# Patient Record
Sex: FEMALE | Race: WHITE | NOT HISPANIC OR LATINO | Employment: UNEMPLOYED | URBAN - METROPOLITAN AREA
[De-identification: names, ages, dates, MRNs, and addresses within clinical notes are randomized per-mention and may not be internally consistent; named-entity substitution may affect disease eponyms.]

---

## 2017-02-25 ENCOUNTER — HOSPITAL ENCOUNTER (EMERGENCY)
Facility: MEDICAL CENTER | Age: 42
End: 2017-02-25
Attending: EMERGENCY MEDICINE
Payer: MEDICAID

## 2017-02-25 VITALS
BODY MASS INDEX: 22.06 KG/M2 | WEIGHT: 129.19 LBS | DIASTOLIC BLOOD PRESSURE: 79 MMHG | SYSTOLIC BLOOD PRESSURE: 117 MMHG | HEIGHT: 64 IN | TEMPERATURE: 99.5 F | HEART RATE: 89 BPM | RESPIRATION RATE: 18 BRPM | OXYGEN SATURATION: 95 %

## 2017-02-25 DIAGNOSIS — F41.8 SITUATIONAL ANXIETY: ICD-10-CM

## 2017-02-25 DIAGNOSIS — S61.219A LACERATION OF FINGER OF LEFT HAND, INITIAL ENCOUNTER: ICD-10-CM

## 2017-02-25 PROCEDURE — 36415 COLL VENOUS BLD VENIPUNCTURE: CPT

## 2017-02-25 PROCEDURE — 304999 HCHG REPAIR-SIMPLE/INTERMED LEVEL 1

## 2017-02-25 PROCEDURE — 304217 HCHG IRRIGATION SYSTEM

## 2017-02-25 PROCEDURE — 700101 HCHG RX REV CODE 250: Performed by: EMERGENCY MEDICINE

## 2017-02-25 PROCEDURE — 90471 IMMUNIZATION ADMIN: CPT

## 2017-02-25 PROCEDURE — 90715 TDAP VACCINE 7 YRS/> IM: CPT | Performed by: EMERGENCY MEDICINE

## 2017-02-25 PROCEDURE — 700111 HCHG RX REV CODE 636 W/ 250 OVERRIDE (IP): Performed by: EMERGENCY MEDICINE

## 2017-02-25 PROCEDURE — 303747 HCHG EXTRA SUTURE

## 2017-02-25 PROCEDURE — 99283 EMERGENCY DEPT VISIT LOW MDM: CPT

## 2017-02-25 RX ORDER — HYDROCODONE BITARTRATE AND ACETAMINOPHEN 5; 325 MG/1; MG/1
1-2 TABLET ORAL EVERY 4 HOURS PRN
Qty: 16 TAB | Refills: 0 | Status: SHIPPED | OUTPATIENT
Start: 2017-02-25

## 2017-02-25 RX ORDER — CEPHALEXIN 500 MG/1
500 CAPSULE ORAL 4 TIMES DAILY
Qty: 20 CAP | Refills: 0 | Status: SHIPPED | OUTPATIENT
Start: 2017-02-25 | End: 2017-03-02

## 2017-02-25 RX ADMIN — LIDOCAINE HYDROCHLORIDE 8 ML: 20 INJECTION, SOLUTION INFILTRATION; PERINEURAL at 13:19

## 2017-02-25 RX ADMIN — CLOSTRIDIUM TETANI TOXOID ANTIGEN (FORMALDEHYDE INACTIVATED), CORYNEBACTERIUM DIPHTHERIAE TOXOID ANTIGEN (FORMALDEHYDE INACTIVATED), BORDETELLA PERTUSSIS TOXOID ANTIGEN (GLUTARALDEHYDE INACTIVATED), BORDETELLA PERTUSSIS FILAMENTOUS HEMAGGLUTININ ANTIGEN (FORMALDEHYDE INACTIVATED), BORDETELLA PERTUSSIS PERTACTIN ANTIGEN, AND BORDETELLA PERTUSSIS FIMBRIAE 2/3 ANTIGEN 0.5 ML: 5; 2; 2.5; 5; 3; 5 INJECTION, SUSPENSION INTRAMUSCULAR at 13:19

## 2017-02-25 NOTE — DISCHARGE INSTRUCTIONS
Laceration Care, Adult  A laceration is a cut that goes through all of the layers of the skin and into the tissue that is right under the skin. Some lacerations heal on their own. Others need to be closed with stitches (sutures), staples, skin adhesive strips, or skin glue. Proper laceration care minimizes the risk of infection and helps the laceration to heal better.  HOW TO CARE FOR YOUR LACERATION  If sutures or staples were used:  · Keep the wound clean and dry.  · If you were given a bandage (dressing), you should change it at least one time per day or as told by your health care provider. You should also change it if it becomes wet or dirty.  · Keep the wound completely dry for the first 24 hours or as told by your health care provider. After that time, you may shower or bathe. However, make sure that the wound is not soaked in water until after the sutures or staples have been removed.  · Clean the wound one time each day or as told by your health care provider:  ¨ Wash the wound with soap and water.  ¨ Rinse the wound with water to remove all soap.  ¨ Pat the wound dry with a clean towel. Do not rub the wound.  · After cleaning the wound, apply a thin layer of antibiotic ointment as told by your health care provider. This will help to prevent infection and keep the dressing from sticking to the wound.  · Have the sutures or staples removed as told by your health care provider.  If skin adhesive strips were used:  · Keep the wound clean and dry.  · If you were given a bandage (dressing), you should change it at least one time per day or as told by your health care provider. You should also change it if it becomes dirty or wet.  · Do not get the skin adhesive strips wet. You may shower or bathe, but be careful to keep the wound dry.  · If the wound gets wet, pat it dry with a clean towel. Do not rub the wound.  · Skin adhesive strips fall off on their own. You may trim the strips as the wound heals. Do not  remove skin adhesive strips that are still stuck to the wound. They will fall off in time.  If skin glue was used:  · Try to keep the wound dry, but you may briefly wet it in the shower or bath. Do not soak the wound in water, such as by swimming.  · After you have showered or bathed, gently pat the wound dry with a clean towel. Do not rub the wound.  · Do not do any activities that will make you sweat heavily until the skin glue has fallen off on its own.  · Do not apply liquid, cream, or ointment medicine to the wound while the skin glue is in place. Using those may loosen the film before the wound has healed.  · If you were given a bandage (dressing), you should change it at least one time per day or as told by your health care provider. You should also change it if it becomes dirty or wet.  · If a dressing is placed over the wound, be careful not to apply tape directly over the skin glue. Doing that may cause the glue to be pulled off before the wound has healed.  · Do not pick at the glue. The skin glue usually remains in place for 5-10 days, then it falls off of the skin.  General Instructions  · Take over-the-counter and prescription medicines only as told by your health care provider.  · If you were prescribed an antibiotic medicine or ointment, take or apply it as told by your doctor. Do not stop using it even if your condition improves.  · To help prevent scarring, make sure to cover your wound with sunscreen whenever you are outside after stitches are removed, after adhesive strips are removed, or when glue remains in place and the wound is healed. Make sure to wear a sunscreen of at least 30 SPF.  · Do not scratch or pick at the wound.  · Keep all follow-up visits as told by your health care provider. This is important.  · Check your wound every day for signs of infection. Watch for:  ¨ Redness, swelling, or pain.  ¨ Fluid, blood, or pus.  · Raise (elevate) the injured area above the level of your heart  while you are sitting or lying down, if possible.  SEEK MEDICAL CARE IF:  · You received a tetanus shot and you have swelling, severe pain, redness, or bleeding at the injection site.  · You have a fever.  · A wound that was closed breaks open.  · You notice a bad smell coming from your wound or your dressing.  · You notice something coming out of the wound, such as wood or glass.  · Your pain is not controlled with medicine.  · You have increased redness, swelling, or pain at the site of your wound.  · You have fluid, blood, or pus coming from your wound.  · You notice a change in the color of your skin near your wound.  · You need to change the dressing frequently due to fluid, blood, or pus draining from the wound.  · You develop a new rash.  · You develop numbness around the wound.  SEEK IMMEDIATE MEDICAL CARE IF:  · You develop severe swelling around the wound.  · Your pain suddenly increases and is severe.  · You develop painful lumps near the wound or on skin that is anywhere on your body.  · You have a red streak going away from your wound.  · The wound is on your hand or foot and you cannot properly move a finger or toe.  · The wound is on your hand or foot and you notice that your fingers or toes look pale or bluish.     This information is not intended to replace advice given to you by your health care provider. Make sure you discuss any questions you have with your health care provider.     Document Released: 12/18/2006 Document Revised: 05/03/2016 Document Reviewed: 12/14/2015  [employer] Interactive Patient Education ©2016 [employer]

## 2017-02-25 NOTE — ED AVS SNAPSHOT
Home Care Instructions                                                                                                                Ama Ferrera   MRN: [id number]    Department:  [facility], Emergency Dept   Date of Visit:  2/25/2017            [facility], Emergency Dept    [address] [place] [id number]    Phone:  [phone]      You were seen by     Duran Vera M.D.      Your Diagnosis Was     Laceration of finger of left hand, initial encounter     S61.219A       These are the medications you received during your hospitalization from 02/25/2017 1247 to 02/25/2017 1422     Date/Time Order Dose Route Action    02/25/2017 1319 lidocaine buffered 1.8% injection 8 mL 8 mL Injection Given    02/25/2017 1319 tetanus-dipth-acell pertussis (ADACEL) inj 0.5 mL 0.5 mL Intramuscular Given      Follow-up Information     1. Follow up with [facility], Emergency Dept.    Specialty:  Emergency Medicine    Why:  sutures will need to be removed in 8-10 days.  Return sooner for any concern of infection.    Contact information    [address] [place] [id number] [phone]      Medication Information     Review all of your home medications and newly ordered medications with your primary doctor and/or pharmacist as soon as possible. Follow medication instructions as directed by your doctor and/or pharmacist.     Please keep your complete medication list with you and share with your physician. Update the information when medications are discontinued, doses are changed, or new medications (including over-the-counter products) are added; and carry medication information at all times in the event of emergency situations.               Medication List      START taking these medications        Instructions    cephALEXin 500 MG Caps   Commonly known as:  KEFLEX    Take 1 Cap by mouth 4 times a day for 5 days.   Dose:  500 mg         ASK your  doctor about these medications        Instructions    flecainide 100 MG Tabs   Commonly known as:  TAMBOCOR    Doctor's comments:  One tablet soon after you get symptoms of the atrial fibrillation,  And call cardiology within 24 hrs for further advice.   Take 1 Tab by mouth 2 times a day.   Dose:  100 mg       * hydrocodone-acetaminophen 5-325 MG Tabs per tablet   What changed:  Another medication with the same name was added. Make sure you understand how and when to take each.   Commonly known as:  NORCO   Ask about: Which instructions should I use?    Take 1-2 Tabs by mouth every four hours as needed.   Dose:  1-2 Tab       * hydrocodone-acetaminophen 5-325 MG Tabs per tablet   What changed:  You were already taking a medication with the same name, and this prescription was added. Make sure you understand how and when to take each.   Commonly known as:  NORCO   Ask about: Which instructions should I use?    Take 1-2 Tabs by mouth every four hours as needed.   Dose:  1-2 Tab       * hydrocodone-acetaminophen 7.5-325 MG per tablet   What changed:  Another medication with the same name was added. Make sure you understand how and when to take each.   Commonly known as:  NORCO   Ask about: Which instructions should I use?    Take 1-2 Tabs by mouth every 6 hours as needed.   Dose:  1-2 Tab       hydroxychloroquine 200 MG Tabs   Commonly known as:  PLAQUENIL    Take 200 mg by mouth 2 times a day. Dose unknown   Dose:  200 mg       hyoscyamine 0.125 MG tablet   Commonly known as:  ANASPAZ, LEVSIN    Take 125 mcg by mouth 3 times a day.   Dose:  125 mcg       * methylPREDNISolone 4 MG tablet   Commonly known as:  MEDROL DOSEPACK    Take medication as directed with food       * methylPREDNISolone 4 MG tablet   Commonly known as:  MEDROL (MALINI)    6 day taper       nicotine polacrilex 2 MG Gum   Commonly known as:  NICORETTE    Take 1 Each by mouth as needed for Smoking Cessation (For nicotine urge).   Dose:  2 mg        pregabalin 25 MG Caps   Commonly known as:  LYRICA    Take 50 mg by mouth every day.   Dose:  50 mg       SUMAtriptan 25 MG Tabs tablet   Commonly known as:  IMITREX    Take  mg by mouth Once PRN.   Dose:   mg       tizanidine 4 MG Tabs   Commonly known as:  ZANAFLEX    Take 4 mg by mouth every 6 hours as needed.   Dose:  4 mg       * Notice:  This list has 5 medication(s) that are the same as other medications prescribed for you. Read the directions carefully, and ask your doctor or other care provider to review them with you.              Discharge Instructions       Laceration Care, Adult  A laceration is a cut that goes through all of the layers of the skin and into the tissue that is right under the skin. Some lacerations heal on their own. Others need to be closed with stitches (sutures), staples, skin adhesive strips, or skin glue. Proper laceration care minimizes the risk of infection and helps the laceration to heal better.  HOW TO CARE FOR YOUR LACERATION  If sutures or staples were used:  · Keep the wound clean and dry.  · If you were given a bandage (dressing), you should change it at least one time per day or as told by your health care provider. You should also change it if it becomes wet or dirty.  · Keep the wound completely dry for the first 24 hours or as told by your health care provider. After that time, you may shower or bathe. However, make sure that the wound is not soaked in water until after the sutures or staples have been removed.  · Clean the wound one time each day or as told by your health care provider:  ¨ Wash the wound with soap and water.  ¨ Rinse the wound with water to remove all soap.  ¨ Pat the wound dry with a clean towel. Do not rub the wound.  · After cleaning the wound, apply a thin layer of antibiotic ointment as told by your health care provider. This will help to prevent infection and keep the dressing from sticking to the wound.  · Have the sutures or staples  removed as told by your health care provider.  If skin adhesive strips were used:  · Keep the wound clean and dry.  · If you were given a bandage (dressing), you should change it at least one time per day or as told by your health care provider. You should also change it if it becomes dirty or wet.  · Do not get the skin adhesive strips wet. You may shower or bathe, but be careful to keep the wound dry.  · If the wound gets wet, pat it dry with a clean towel. Do not rub the wound.  · Skin adhesive strips fall off on their own. You may trim the strips as the wound heals. Do not remove skin adhesive strips that are still stuck to the wound. They will fall off in time.  If skin glue was used:  · Try to keep the wound dry, but you may briefly wet it in the shower or bath. Do not soak the wound in water, such as by swimming.  · After you have showered or bathed, gently pat the wound dry with a clean towel. Do not rub the wound.  · Do not do any activities that will make you sweat heavily until the skin glue has fallen off on its own.  · Do not apply liquid, cream, or ointment medicine to the wound while the skin glue is in place. Using those may loosen the film before the wound has healed.  · If you were given a bandage (dressing), you should change it at least one time per day or as told by your health care provider. You should also change it if it becomes dirty or wet.  · If a dressing is placed over the wound, be careful not to apply tape directly over the skin glue. Doing that may cause the glue to be pulled off before the wound has healed.  · Do not pick at the glue. The skin glue usually remains in place for 5-10 days, then it falls off of the skin.  General Instructions  · Take over-the-counter and prescription medicines only as told by your health care provider.  · If you were prescribed an antibiotic medicine or ointment, take or apply it as told by your doctor. Do not stop using it even if your condition  improves.  · To help prevent scarring, make sure to cover your wound with sunscreen whenever you are outside after stitches are removed, after adhesive strips are removed, or when glue remains in place and the wound is healed. Make sure to wear a sunscreen of at least 30 SPF.  · Do not scratch or pick at the wound.  · Keep all follow-up visits as told by your health care provider. This is important.  · Check your wound every day for signs of infection. Watch for:  ¨ Redness, swelling, or pain.  ¨ Fluid, blood, or pus.  · Raise (elevate) the injured area above the level of your heart while you are sitting or lying down, if possible.  SEEK MEDICAL CARE IF:  · You received a tetanus shot and you have swelling, severe pain, redness, or bleeding at the injection site.  · You have a fever.  · A wound that was closed breaks open.  · You notice a bad smell coming from your wound or your dressing.  · You notice something coming out of the wound, such as wood or glass.  · Your pain is not controlled with medicine.  · You have increased redness, swelling, or pain at the site of your wound.  · You have fluid, blood, or pus coming from your wound.  · You notice a change in the color of your skin near your wound.  · You need to change the dressing frequently due to fluid, blood, or pus draining from the wound.  · You develop a new rash.  · You develop numbness around the wound.  SEEK IMMEDIATE MEDICAL CARE IF:  · You develop severe swelling around the wound.  · Your pain suddenly increases and is severe.  · You develop painful lumps near the wound or on skin that is anywhere on your body.  · You have a red streak going away from your wound.  · The wound is on your hand or foot and you cannot properly move a finger or toe.  · The wound is on your hand or foot and you notice that your fingers or toes look pale or bluish.     This information is not intended to replace advice given to you by your health care provider. Make sure you  discuss any questions you have with your health care provider.     Document Released: 12/18/2006 Document Revised: 05/03/2016 Document Reviewed: 12/14/2015  [employer] Interactive Patient Education ©2016 [employer]            Patient Information     Patient Information    Following emergency treatment: all patient requiring follow-up care must return either to a private physician or a clinic if your condition worsens before you are able to obtain further medical attention, please return to the emergency room.     Billing Information    At [facility], we work to make the billing process streamlined for our patients.  Our Representatives are here to answer any questions you may have regarding your hospital bill.  If you have insurance coverage and have supplied your insurance information to us, we will submit a claim to your insurer on your behalf.  Should you have any questions regarding your bill, we can be reached online or by phone as follows:  Online: You are able pay your bills online or live chat with our representatives about any billing questions you may have. We are here to help Monday - Friday from 8:00am to 7:30pm and 9:00am - 12:00pm on Saturdays.  Please visit [email or web address]  for more information.   Phone:  [phone] or [phone]    Please note that your emergency physician, surgeon, pathologist, radiologist, anesthesiologist, and other specialists are not employed by [facility] and will therefore bill separately for their services.  Please contact them directly for any questions concerning their bills at the numbers below:     Emergency Physician Services:  [phone]  [facility]:  [phone]  [facility]:  [phone]  [facility]:  [phone]    1. Your final bill may vary from the amount quoted upon discharge if all procedures are not complete at that time, or if your doctor has additional  procedures of which we are not aware. You will receive an additional bill if you return to the Emergency Department at [facility] for suture removal regardless of the facility of which the sutures were placed.     2. Please arrange for settlement of this account at the emergency registration.    3. All self-pay accounts are due in full at the time of treatment.  If you are unable to meet this obligation then payment is expected within 4-5 days.     4. If you have had radiology studies (CT, X-ray, Ultrasound, MRI), you have received a preliminary result during your emergency department visit. Please contact the radiology department [phone] to receive a copy of your final result. Please discuss the Final result with your primary physician or with the follow up physician provided.     Crisis Hotline:  [place] Crisis Hotline:  9-574-VVNRWTL or 1-650.184.7599  [place] Crisis Hotline:    1-488.373.1043 or 546-612-0864         ED Discharge Follow Up Questions    1. In order to provide you with very good care, we would like to follow up with a phone call in the next few days.  May we have your permission to contact you?     YES /  NO    2. What is the best phone number to call you? (       )_____-__________    3. What is the best time to call you?      Morning  /  Afternoon  /  Evening                   Patient Signature:  ____________________________________________________________    Date:  ____________________________________________________________

## 2017-02-25 NOTE — ED PROVIDER NOTES
ED Provider Note    CHIEF COMPLAINT   Chief Complaint   Patient presents with   • Hand Laceration     amb to triage. reports index and little finger lacerations, dressed at registration desk, bleeding controlled. states cut fingers trying to get her stereo out of the car with her keys. Very evasive with information       HPI   Ama Ferrera is a 41 y.o. female who presents with laceration to left fingers 2 and 5.  She states she was lifting a metal object which lacerated her fingers.  Last tetanus shot unknown.  Patient's family agitated and confrontational on initial assessment.  Ultimately states she has numbness in her 5th finger after being asked several times.  The injuries occurred within the past 1 hour.  Pain is moderate to severe.    REVIEW OF SYSTEMS   Musculoskeletal: Left finger pain  Neurologic: Numbness left 5th finger  Skin: Lacerated fingers 2 and 5      PAST MEDICAL HISTORY   Past Medical History   Diagnosis Date   • Ovarian cyst    • Diverticulosis    • Diverticulitis    • Interstitial cystitis    • Lupus    • Rheumatoid arthritis(714.0)    • Fibromyalgia    • Diverticulosis    • Degeneration of cervical intervertebral disc        FAMILY HISTORY  No family history on file.    SOCIAL HISTORY  Social History     Social History   • Marital Status:      Spouse Name: N/A   • Number of Children: N/A   • Years of Education: N/A     Social History Main Topics   • Smoking status: Current Every Day Smoker -- 0.20 packs/day     Types: Cigarettes   • Smokeless tobacco: Never Used   • Alcohol Use: No   • Drug Use: No   • Sexual Activity: Not on file     Other Topics Concern   • Not on file     Social History Narrative       SURGICAL HISTORY  Past Surgical History   Procedure Laterality Date   • Gyn surgery           • Other abdominal surgery       explor lap    • Pr u/s leiomyomata ablate <200 cc     • Oophorectomy Left        CURRENT MEDICATIONS   Home Medications     **Home  "medications have not yet been reviewed for this encounter**          ALLERGIES   Allergies   Allergen Reactions   • Amoxicillin Diarrhea and Nausea   • Gabapentin    • Tramadol Nausea       PHYSICAL EXAM  VITAL SIGNS: /79 mmHg  Pulse 89  Temp(Src) 37.5 °C (99.5 °F)  Resp 18  Ht 1.626 m (5' 4\")  Wt 58.6 kg (129 lb 3 oz)  BMI 22.16 kg/m2  SpO2 95%  Skin: 3 cm Ragged V-shaped laceration of the distal volar pad of the 5th finger,.  1 cm flap-like  laceration of the volar surface distal 2nd finger  Vascular: Intact distal pulses.   Musculoskeletal: Flexion and extension of all fingers normal.  No bony tenderness.  Neurologic: Diminished sensation to the tip of the left 5th finger    RADIOLOGY/PROCEDURES  Laceration Repair Procedure Note    Indication: Laceration    Procedure: The patient was placed in the appropriate position and anesthesia around the laceration was obtained by infiltration using 2% Lidocaine without epinephrine. The area was then cleansed with betadine and draped in a sterile fashion and irrigated with normal saline. The laceration was closed with 5-0 Ethilon using interrupted sutures. A second laceration was cleaned and bandaged, patient refused stitches on her index finger. The wound area was then dressed with bacitracin, a bandage and aluminum splint to the 5th finger.      Total repaired wound length: 3 cm.     Other Items: None    The patient tolerated the procedure with difficulty, secondary to pain and agitation.    Complications: None          COURSE & MEDICAL DECISION MAKING  Pertinent Labs & Imaging studies reviewed. (See chart for details)  The patient's index finger will likely feel well, suturing may have improve the healing time however the patient did allow us to clean and bandage it.  Her 5th finger laceration extends over the joint space proximally, therefore the finger was splinted.  Protruding from the wound was subcutaneous tissue and I suspect digital nerve was injured. "  Patient advised of the potential for persistent numbness and the need for follow-up.  She is advised to return for any concern of infection.  She is placed on antibiotics, she stated she has tolerated Keflex in the past despite her penicillin allergy.  She was prescribed pain medication as well.  Patient was much more calm upon discharge.    FINAL IMPRESSION     1. Laceration of finger of left hand, initial encounter    2. Situational anxiety              Electronically signed by: Duran Vera, 2/25/2017 5:18 PM

## 2017-02-25 NOTE — ED AVS SNAPSHOT
[employer] Access Code: [email or web address]  Expires: 3/27/2017  2:22 PM    [employer]  A secure, online tool to manage your health information     [email or web address]® is a secure, online tool that connects you to your personalized health information from the privacy of your home -- day or night - making it very easy for you to manage your healthcare. Once the activation process is completed, you can even access your medical information using the [employer] ree, which is available for free in the Apple Ree store or Google Play store.     [employer] provides the following levels of access (as shown below):   My Chart Features   [facility] Primary Care Doctor [facility]  Specialists [facility]  Urgent  Care Non-[facility]  Primary Care  Doctor   Email your healthcare team securely and privately 24/7 X X X X   Manage appointments: schedule your next appointment; view details of past/upcoming appointments X      Request prescription refills. X      View recent personal medical records, including lab and immunizations X X X X   View health record, including health history, allergies, medications X X X X   Read reports about your outpatient visits, procedures, consult and ER notes X X X X   See your discharge summary, which is a recap of your hospital and/or ER visit that includes your diagnosis, lab results, and care plan. X X       How to register for [employer]:  1. Go to  [email or web address].  2. Click on the Sign Up Now box, which takes you to the New Member Sign Up page. You will need to provide the following information:  a. Enter your [employer] Access Code exactly as it appears at the top of this page. (You will not need to use this code after you’ve completed the sign-up process. If you do not sign up before the expiration date, you must request a new code.)   b. Enter your date of birth.   c. Enter your home email address.   d. Click Submit, and follow the next screen’s instructions.  3. Create a [employer] ID. This will be your [employer]  login ID and cannot be changed, so think of one that is secure and easy to remember.  4. Create a [employer] password. You can change your password at any time.  5. Enter your Password Reset Question and Answer. This can be used at a later time if you forget your password.   6. Enter your e-mail address. This allows you to receive e-mail notifications when new information is available in [employer].  7. Click Sign Up. You can now view your health information.    For assistance activating your [employer] account, call [phone]

## 2017-02-25 NOTE — ED NOTES
Chief Complaint   Patient presents with   • Hand Laceration     amb to triage. reports index and little finger lacerations, dressed at registration desk, bleeding controlled. states cut fingers trying to get her stereo out of the car with her keys. Very evasive with information

## 2017-02-25 NOTE — ED AVS SNAPSHOT
2/25/2017          Ama Samayoa Quiring  [address] [place] [address]    Dear Ama:    [facility] wants to ensure your discharge home is safe and you or your loved ones have had all your questions answered regarding your care after you leave the hospital.    You may receive a telephone call within two days of your discharge.  This call is to make certain you understand your discharge instructions as well as ensure we provided you with the best care possible during your stay with us.     The call will only last approximately 3-5 minutes and will be done by a nurse.    Once again, we want to ensure your discharge home is safe and that you have a clear understanding of any next steps in your care.  If you have any questions or concerns, please do not hesitate to contact us, we are here for you.  Thank you for choosing [facility] for your healthcare needs.    Sincerely,    Kel Garcia    [facility]

## 2021-05-30 ENCOUNTER — APPOINTMENT (OUTPATIENT)
Dept: RADIOLOGY | Facility: MEDICAL CENTER | Age: 46
End: 2021-05-30
Attending: EMERGENCY MEDICINE
Payer: MEDICAID

## 2021-05-30 ENCOUNTER — HOSPITAL ENCOUNTER (OUTPATIENT)
Facility: MEDICAL CENTER | Age: 46
End: 2021-06-01
Attending: EMERGENCY MEDICINE | Admitting: INTERNAL MEDICINE
Payer: MEDICAID

## 2021-05-30 DIAGNOSIS — U07.1 COVID-19 VIRUS INFECTION: ICD-10-CM

## 2021-05-30 PROBLEM — F15.11 METHAMPHETAMINE ABUSE IN REMISSION (HCC): Status: ACTIVE | Noted: 2021-05-30

## 2021-05-30 PROBLEM — D72.819 LEUKOPENIA: Status: ACTIVE | Noted: 2021-05-30

## 2021-05-30 PROBLEM — E87.1 HYPONATREMIA: Status: ACTIVE | Noted: 2021-05-30

## 2021-05-30 LAB
ALBUMIN SERPL BCP-MCNC: 4 G/DL (ref 3.2–4.9)
ALBUMIN/GLOB SERPL: 1.4 G/DL
ALP SERPL-CCNC: 77 U/L (ref 30–99)
ALT SERPL-CCNC: 20 U/L (ref 2–50)
ANION GAP SERPL CALC-SCNC: 10 MMOL/L (ref 7–16)
AST SERPL-CCNC: 20 U/L (ref 12–45)
BASOPHILS # BLD AUTO: 0.5 % (ref 0–1.8)
BASOPHILS # BLD: 0.02 K/UL (ref 0–0.12)
BILIRUB SERPL-MCNC: 0.2 MG/DL (ref 0.1–1.5)
BUN SERPL-MCNC: 9 MG/DL (ref 8–22)
CALCIUM SERPL-MCNC: 8.4 MG/DL (ref 8.4–10.2)
CHLORIDE SERPL-SCNC: 99 MMOL/L (ref 96–112)
CO2 SERPL-SCNC: 25 MMOL/L (ref 20–33)
CREAT SERPL-MCNC: 0.54 MG/DL (ref 0.5–1.4)
EOSINOPHIL # BLD AUTO: 0.01 K/UL (ref 0–0.51)
EOSINOPHIL NFR BLD: 0.3 % (ref 0–6.9)
ERYTHROCYTE [DISTWIDTH] IN BLOOD BY AUTOMATED COUNT: 43 FL (ref 35.9–50)
FLUAV RNA SPEC QL NAA+PROBE: NEGATIVE
FLUBV RNA SPEC QL NAA+PROBE: NEGATIVE
GLOBULIN SER CALC-MCNC: 2.8 G/DL (ref 1.9–3.5)
GLUCOSE SERPL-MCNC: 94 MG/DL (ref 65–99)
HCT VFR BLD AUTO: 38.7 % (ref 37–47)
HGB BLD-MCNC: 12.5 G/DL (ref 12–16)
IMM GRANULOCYTES # BLD AUTO: 0.01 K/UL (ref 0–0.11)
IMM GRANULOCYTES NFR BLD AUTO: 0.3 % (ref 0–0.9)
LYMPHOCYTES # BLD AUTO: 0.48 K/UL (ref 1–4.8)
LYMPHOCYTES NFR BLD: 12.9 % (ref 22–41)
MCH RBC QN AUTO: 31.1 PG (ref 27–33)
MCHC RBC AUTO-ENTMCNC: 32.3 G/DL (ref 33.6–35)
MCV RBC AUTO: 96.3 FL (ref 81.4–97.8)
MONOCYTES # BLD AUTO: 0.36 K/UL (ref 0–0.85)
MONOCYTES NFR BLD AUTO: 9.7 % (ref 0–13.4)
NEUTROPHILS # BLD AUTO: 2.84 K/UL (ref 2–7.15)
NEUTROPHILS NFR BLD: 76.3 % (ref 44–72)
NRBC # BLD AUTO: 0 K/UL
NRBC BLD-RTO: 0 /100 WBC
PLATELET # BLD AUTO: 229 K/UL (ref 164–446)
PMV BLD AUTO: 9.7 FL (ref 9–12.9)
POTASSIUM SERPL-SCNC: 4.3 MMOL/L (ref 3.6–5.5)
PROT SERPL-MCNC: 6.8 G/DL (ref 6–8.2)
RBC # BLD AUTO: 4.02 M/UL (ref 4.2–5.4)
RSV RNA SPEC QL NAA+PROBE: NEGATIVE
SARS-COV-2 RNA RESP QL NAA+PROBE: DETECTED
SODIUM SERPL-SCNC: 134 MMOL/L (ref 135–145)
SPECIMEN SOURCE: ABNORMAL
WBC # BLD AUTO: 3.7 K/UL (ref 4.8–10.8)

## 2021-05-30 PROCEDURE — A9270 NON-COVERED ITEM OR SERVICE: HCPCS | Performed by: INTERNAL MEDICINE

## 2021-05-30 PROCEDURE — 0241U HCHG SARS-COV-2 COVID-19 NFCT DS RESP RNA 4 TRGT MIC: CPT

## 2021-05-30 PROCEDURE — 36415 COLL VENOUS BLD VENIPUNCTURE: CPT

## 2021-05-30 PROCEDURE — 700102 HCHG RX REV CODE 250 W/ 637 OVERRIDE(OP): Performed by: INTERNAL MEDICINE

## 2021-05-30 PROCEDURE — 71045 X-RAY EXAM CHEST 1 VIEW: CPT

## 2021-05-30 PROCEDURE — G0378 HOSPITAL OBSERVATION PER HR: HCPCS

## 2021-05-30 PROCEDURE — 700105 HCHG RX REV CODE 258: Performed by: EMERGENCY MEDICINE

## 2021-05-30 PROCEDURE — 85025 COMPLETE CBC W/AUTO DIFF WBC: CPT

## 2021-05-30 PROCEDURE — 99285 EMERGENCY DEPT VISIT HI MDM: CPT

## 2021-05-30 PROCEDURE — 99220 PR INITIAL OBSERVATION CARE,LEVL III: CPT | Performed by: INTERNAL MEDICINE

## 2021-05-30 PROCEDURE — 80053 COMPREHEN METABOLIC PANEL: CPT

## 2021-05-30 PROCEDURE — C9803 HOPD COVID-19 SPEC COLLECT: HCPCS | Performed by: EMERGENCY MEDICINE

## 2021-05-30 RX ORDER — AMOXICILLIN 250 MG
1 CAPSULE ORAL 2 TIMES DAILY PRN
Status: DISCONTINUED | OUTPATIENT
Start: 2021-05-30 | End: 2021-06-01 | Stop reason: HOSPADM

## 2021-05-30 RX ORDER — NICOTINE 21 MG/24HR
14 PATCH, TRANSDERMAL 24 HOURS TRANSDERMAL
Status: DISCONTINUED | OUTPATIENT
Start: 2021-05-30 | End: 2021-06-01 | Stop reason: HOSPADM

## 2021-05-30 RX ORDER — SODIUM CHLORIDE 9 MG/ML
250 INJECTION, SOLUTION INTRAVENOUS ONCE
Status: COMPLETED | OUTPATIENT
Start: 2021-05-30 | End: 2021-05-31

## 2021-05-30 RX ORDER — IBUPROFEN 200 MG
400 TABLET ORAL EVERY 6 HOURS PRN
COMMUNITY

## 2021-05-30 RX ORDER — ONDANSETRON 2 MG/ML
4 INJECTION INTRAMUSCULAR; INTRAVENOUS EVERY 6 HOURS PRN
Status: DISCONTINUED | OUTPATIENT
Start: 2021-05-30 | End: 2021-06-01 | Stop reason: HOSPADM

## 2021-05-30 RX ORDER — CHOLECALCIFEROL (VITAMIN D3) 125 MCG
5 CAPSULE ORAL NIGHTLY PRN
Status: DISCONTINUED | OUTPATIENT
Start: 2021-05-30 | End: 2021-06-01 | Stop reason: HOSPADM

## 2021-05-30 RX ORDER — ACETAMINOPHEN 325 MG/1
650 TABLET ORAL EVERY 6 HOURS PRN
Status: DISCONTINUED | OUTPATIENT
Start: 2021-05-30 | End: 2021-06-01 | Stop reason: HOSPADM

## 2021-05-30 RX ORDER — ONDANSETRON 4 MG/1
4 TABLET, ORALLY DISINTEGRATING ORAL EVERY 6 HOURS PRN
Status: DISCONTINUED | OUTPATIENT
Start: 2021-05-30 | End: 2021-06-01 | Stop reason: HOSPADM

## 2021-05-30 RX ORDER — ACETAMINOPHEN 325 MG/1
650 TABLET ORAL EVERY 6 HOURS PRN
Status: DISCONTINUED | OUTPATIENT
Start: 2021-05-30 | End: 2021-05-30

## 2021-05-30 RX ADMIN — ACETAMINOPHEN 650 MG: 325 TABLET, FILM COATED ORAL at 20:00

## 2021-05-30 RX ADMIN — SODIUM CHLORIDE 250 ML: 9 INJECTION, SOLUTION INTRAVENOUS at 15:50

## 2021-05-30 ASSESSMENT — LIFESTYLE VARIABLES
CONSUMPTION TOTAL: NEGATIVE
ON A TYPICAL DAY WHEN YOU DRINK ALCOHOL HOW MANY DRINKS DO YOU HAVE: 0
EVER FELT BAD OR GUILTY ABOUT YOUR DRINKING: NO
TOTAL SCORE: 0
HAVE PEOPLE ANNOYED YOU BY CRITICIZING YOUR DRINKING: NO
HAVE YOU EVER FELT YOU SHOULD CUT DOWN ON YOUR DRINKING: NO
HOW MANY TIMES IN THE PAST YEAR HAVE YOU HAD 5 OR MORE DRINKS IN A DAY: 0
ALCOHOL_USE: NO
AVERAGE NUMBER OF DAYS PER WEEK YOU HAVE A DRINK CONTAINING ALCOHOL: 0
EVER HAD A DRINK FIRST THING IN THE MORNING TO STEADY YOUR NERVES TO GET RID OF A HANGOVER: NO

## 2021-05-30 ASSESSMENT — ENCOUNTER SYMPTOMS
NAUSEA: 0
COUGH: 1
MYALGIAS: 1
HEADACHES: 1
ABDOMINAL PAIN: 0
VOMITING: 0
DEPRESSION: 0
SHORTNESS OF BREATH: 0
DIZZINESS: 0
FEVER: 0
CHILLS: 0

## 2021-05-30 ASSESSMENT — COGNITIVE AND FUNCTIONAL STATUS - GENERAL
SUGGESTED CMS G CODE MODIFIER DAILY ACTIVITY: CH
MOBILITY SCORE: 24
SUGGESTED CMS G CODE MODIFIER MOBILITY: CH
DAILY ACTIVITIY SCORE: 24

## 2021-05-30 ASSESSMENT — PATIENT HEALTH QUESTIONNAIRE - PHQ9
1. LITTLE INTEREST OR PLEASURE IN DOING THINGS: NOT AT ALL
2. FEELING DOWN, DEPRESSED, IRRITABLE, OR HOPELESS: NOT AT ALL
SUM OF ALL RESPONSES TO PHQ9 QUESTIONS 1 AND 2: 0

## 2021-05-30 ASSESSMENT — FIBROSIS 4 INDEX: FIB4 SCORE: 0.94

## 2021-05-30 NOTE — ED NOTES
Pt informed of COVID status  Will call [facility] to inform them as well  Pt to be admitted  hospitalist at  now for evaluation and admission orders

## 2021-05-30 NOTE — ED PROVIDER NOTES
ED Provider Note    CHIEF COMPLAINT  Chief Complaint   Patient presents with   • Headache   • Cough   • Body Aches   • Congestion       HPI  Ama Ferrera is a 46 y.o. female who presents to the ER with complaint of nasal congestion, sinus pain and pressure, headache, cough, and myalgias.  Patient reports that 5 days ago she started developing nasal congestion and myalgias.  She then started developing headache.  The headache is been fairly constant.  She has a cough.  She says the cough is really not very bothersome.  No fevers.  She has had some chills.  Today she lost sense of taste and smell.  She lives today transitional housing facility for women in rehab for drugs.  The patient has been clean for 7 months but was addicted to methamphetamine.  She says that there have been other women at the [facility] that have been coughing and ill.  She did not get her Covid vaccine.  She has not yet had Covid this year.  She does not think she has Covid now.  No chest pain.  No shortness of breath.  No hemoptysis.  No pain or swelling in the legs.  No rash.  She has been having some diarrhea on and off over the last few days since she has been sick.  No nausea or vomiting.  No stiff neck.  She says she blew out some thick green and bloody nasal discharge a few times with her nasal congestion.      REVIEW OF SYSTEMS  See HPI for further details. Positive for myalgias, headache, sinus pain/pressure, nasal congestion, Loss of taste and smell, cough.  Negative for vomiting, chest pain, shortness of breath, hemoptysis, stiff neck, fever.  All other systems are negative.    PAST MEDICAL HISTORY  Past Medical History:   Diagnosis Date   • Degeneration of cervical intervertebral disc    • Diverticulitis    • Diverticulosis    • Diverticulosis    • Fibromyalgia    • Interstitial cystitis    • Lupus (HCC)    • Ovarian cyst    • Rheumatoid arthritis(714.0)        FAMILY HISTORY  History reviewed. No  pertinent family history.    SOCIAL HISTORY  Social History     Socioeconomic History   • Marital status:      Spouse name: Not on file   • Number of children: Not on file   • Years of education: Not on file   • Highest education level: Not on file   Occupational History   • Not on file   Tobacco Use   • Smoking status: Current Every Day Smoker     Packs/day: 0.20     Types: Cigarettes   • Smokeless tobacco: Never Used   Substance and Sexual Activity   • Alcohol use: No   • Drug use: No   • Sexual activity: Not on file   Other Topics Concern   • Not on file   Social History Narrative   • Not on file     Social Determinants of Health     Financial Resource Strain:    • Difficulty of Paying Living Expenses:    Food Insecurity:    • Worried About Running Out of Food in the Last Year:    • Ran Out of Food in the Last Year:    Transportation Needs:    • Lack of Transportation (Medical):    • Lack of Transportation (Non-Medical):    Physical Activity:    • Days of Exercise per Week:    • Minutes of Exercise per Session:    Stress:    • Feeling of Stress :    Social Connections:    • Frequency of Communication with Friends and Family:    • Frequency of Social Gatherings with Friends and Family:    • Attends [employer] Services:    • Active Member of Clubs or Organizations:    • Attends Club or Organization Meetings:    • Marital Status:    Intimate Partner Violence:    • Fear of Current or Ex-Partner:    • Emotionally Abused:    • Physically Abused:    • Sexually Abused:        SURGICAL HISTORY  Past Surgical History:   Procedure Laterality Date   • GYN SURGERY          • OOPHORECTOMY Left    • OTHER ABDOMINAL SURGERY      explor lap    • U/S LEIOMYOMATA ABLATE <200 CC         CURRENT MEDICATIONS  Home Medications     Reviewed by Alejandro Alas (Pharmacy Tech) on 21 at 1415  Med List Status: Complete   Medication Last Dose Status   flecainide (TAMBOCOR) 100 MG Tab Not Taking Active  "  hydrocodone-acetaminophen (NORCO) 5-325 MG Tab per tablet Not Taking Active   hydrocodone-acetaminophen (NORCO) 5-325 MG Tab per tablet Not Taking Active   ibuprofen (MOTRIN) 200 MG Tab 5/30/2021 Active   methylPREDNISolone (MEDROL DOSEPACK) 4 MG tablet Not Taking Active   methylPREDNISolone (MEDROL, MALINI,) 4 MG tablet Not Taking Active   nicotine polacrilex (NICORETTE) 2 MG Gum Not Taking Active                ALLERGIES  Allergies   Allergen Reactions   • Amoxicillin Diarrhea and Nausea   • Gabapentin Unspecified     Pt is not sure what happens, but knows that she has an allergie to this medication.     • Tramadol Rash     Pt thinks that she gets a rash from this medication        PHYSICAL EXAM  VITAL SIGNS: /66   Pulse 89   Temp 36.1 °C (97 °F) (Temporal)   Resp 20   Ht 1.626 m (5' 4\")   Wt 71 kg (156 lb 8.4 oz)   SpO2 96%   BMI 26.87 kg/m²      Constitutional: Well developed, well nourished; No acute distress; Non-toxic appearance.   HENT: Normocephalic, atraumatic; Bilateral external ears normal; oropharyngeal examination deferred due to Covid 19 outbreak and lack of oropharyngeal complaint.  Eyes: PERRL, EOMI, Conjunctiva normal. No discharge.   Neck:  Supple, nontender midline; No stridor; No nuchal rigidity.   Lymphatic: No cervical lymphadenopathy noted.   Cardiovascular: Regular rate and rhythm without murmurs, rubs, or gallop.   Thorax & Lungs: No respiratory distress, breath sounds clear to auscultation bilaterally without wheezing, rales or rhonchi. Nontender chest wall. No crepitus or subcutaneous air  Abdomen: Soft, nontender, bowel sounds normal. No obvious masses; No pulsatile masses; no rebound, guarding, or peritoneal signs.   Skin: Good color; warm and dry without rash or petechia.  Back: Nontender, No CVA tenderness. .   Extremities: Distal radial, dorsalis pedis, posterior tibial pulses are equal bilaterally; No edema; Nontender calves or saphenous, No cyanosis, No clubbing. "   Musculoskeletal: Good range of motion in all major joints. No tenderness to palpation or major deformities noted.   Neurologic: Alert & oriented x 4, clear speech        RADIOLOGY/PROCEDURES  DX-CHEST-PORTABLE (1 VIEW)   Final Result      Mild cardiomegaly.          COURSE & MEDICAL DECISION MAKING  Pertinent Labs & Imaging studies reviewed. (See chart for details)  Results for orders placed or performed during the hospital encounter of 05/30/21   CBC WITH DIFFERENTIAL   Result Value Ref Range    WBC 3.7 (L) 4.8 - 10.8 K/uL    RBC 4.02 (L) 4.20 - 5.40 M/uL    Hemoglobin 12.5 12.0 - 16.0 g/dL    Hematocrit 38.7 37.0 - 47.0 %    MCV 96.3 81.4 - 97.8 fL    MCH 31.1 27.0 - 33.0 pg    MCHC 32.3 (L) 33.6 - 35.0 g/dL    RDW 43.0 35.9 - 50.0 fL    Platelet Count 229 164 - 446 K/uL    MPV 9.7 9.0 - 12.9 fL    Neutrophils-Polys 76.30 (H) 44.00 - 72.00 %    Lymphocytes 12.90 (L) 22.00 - 41.00 %    Monocytes 9.70 0.00 - 13.40 %    Eosinophils 0.30 0.00 - 6.90 %    Basophils 0.50 0.00 - 1.80 %    Immature Granulocytes 0.30 0.00 - 0.90 %    Nucleated RBC 0.00 /100 WBC    Neutrophils (Absolute) 2.84 2.00 - 7.15 K/uL    Lymphs (Absolute) 0.48 (L) 1.00 - 4.80 K/uL    Monos (Absolute) 0.36 0.00 - 0.85 K/uL    Eos (Absolute) 0.01 0.00 - 0.51 K/uL    Baso (Absolute) 0.02 0.00 - 0.12 K/uL    Immature Granulocytes (abs) 0.01 0.00 - 0.11 K/uL    NRBC (Absolute) 0.00 K/uL   COMP METABOLIC PANEL   Result Value Ref Range    Sodium 134 (L) 135 - 145 mmol/L    Potassium 4.3 3.6 - 5.5 mmol/L    Chloride 99 96 - 112 mmol/L    Co2 25 20 - 33 mmol/L    Anion Gap 10.0 7.0 - 16.0    Glucose 94 65 - 99 mg/dL    Bun 9 8 - 22 mg/dL    Creatinine 0.54 0.50 - 1.40 mg/dL    Calcium 8.4 8.4 - 10.2 mg/dL    AST(SGOT) 20 12 - 45 U/L    ALT(SGPT) 20 2 - 50 U/L    Alkaline Phosphatase 77 30 - 99 U/L    Total Bilirubin 0.2 0.1 - 1.5 mg/dL    Albumin 4.0 3.2 - 4.9 g/dL    Total Protein 6.8 6.0 - 8.2 g/dL    Globulin 2.8 1.9 - 3.5 g/dL    A-G Ratio 1.4 g/dL    COV-2, FLU A/B, AND RSV BY PCR (2-4 HOURS CEPHEID): Collect NP swab in VTM    Specimen: Nasopharyngeal; Respirate   Result Value Ref Range    Influenza virus A RNA Negative Negative    Influenza virus B, PCR Negative Negative    RSV, PCR Negative Negative    SARS-CoV-2 by PCR DETECTED (AA)     SARS-CoV-2 Source NP Swab    ESTIMATED GFR   Result Value Ref Range    GFR If African American >60 >60 mL/min/1.73 m 2    GFR If Non African American >60 >60 mL/min/1.73 m 2       Patient presents to the ER complaining of nasal congestion, sinus pain and pressure, headache, myalgias, cough and diarrhea.  Symptoms initially began 5 days ago.  She lives in transitional housing with multiple other women.  Some of the other women have been sick with similar symptoms.  Patient is alive history of any asthma or diabetes.  She denies chest pain or shortness of breath.  Today she lost sense of taste and smell.  I was highly suspicious for COVID-19.  COVID-19 test was sent and came back positive.  The patient lives in congregate housing with other people.  She would not be able to go back due to her COVID-19 positivity.  She developed a little bit of hypotension here in the ER.  Blood pressure dropped in the low 100s down to 86 systolic.  She was given a small aliquot of fluid (250 cc.).  She is not in any distress.  She is not hypoxic.  Chest x-ray does not reveal any obvious groundglass infiltrate.  However, since patient lives in congregate housing she will need to be hospitalized for her COVID-19.  I spoke with Dr. Waterman, hospitalist on-call, and she will kindly evaluate her for hospitalization.    1605: Discussed case with Dr. Waterman, hospitalist on-call, and she will kindly evaluate the patient hospitalization.    I verified that the patient was wearing a mask and I was wearing appropriate PPE every time I entered the room. The patient's mask was on the patient at all times during my encounter except for a brief view of the  oropharynx.    FINAL IMPRESSION  1. COVID-19 virus infection Acute         This dictation has been created using voice recognition software. The accuracy of the dictation is limited by the abilities of the software. I expect there may be some errors of grammar and possibly content. I made every attempt to manually correct the errors within my dictation. However, errors related to voice recognition software may still exist and should be interpreted within the appropriate context.    Electronically signed by: Brandie Ambriz M.D., 5/30/2021 2:32 PM

## 2021-05-30 NOTE — ASSESSMENT & PLAN NOTE
Hx of paroxysmal back in 2015   Currently in sinus   EKG pending   Low CHADSVAC no indication for AC

## 2021-05-30 NOTE — ASSESSMENT & PLAN NOTE
Chest x-ray shows no acute process or infiltrates  Isolation precautions  Currently patient on room air, no indication for Decadron  Consider steroids if patient develops hypoxemic respiratory failure on duration of symptoms more than 7 days

## 2021-05-30 NOTE — ED NOTES
Med rec updated and complete  Allergies reviewed  Pt reports no prescription medications since 2016 or vitamins   Pt reports no antibiotics in the last 2 weeks     No current facility-administered medications on file prior to encounter.     Current Outpatient Medications on File Prior to Encounter   Medication Sig Dispense Refill   • ibuprofen (MOTRIN) 200 MG Tab Take 400 mg by mouth every 6 hours as needed for Mild Pain.

## 2021-05-30 NOTE — DISCHARGE PLANNING
Anticipated Discharge Disposition: Unknown  Action: Pt was in Transitional care housing. [facility] ([place], [place]) [phone]. Pt has advised the Center already she is being admitted and of her status. ERCM advised RN that determination of where she will go will be based on discharge needs post acute, likely covid housing not transitional care setting. ER CM left report and update floor CM in am with report    Barriers to Discharge: Unknown    Plan: Floor CM to follow

## 2021-05-30 NOTE — ED NOTES
Aldo from Lab called with critical result of COVID at positive. Critical lab result read back to Aldo.   Dr. Ambriz notified of critical lab result at 1600.  Critical lab result read back by Dr. Ambriz.

## 2021-05-30 NOTE — H&P
Hospital Medicine History & Physical Note    Date of Service  5/30/2021    Primary Care Physician  No primary care provider on file.    Consultants  N/A    Code Status  Full Code    Chief Complaint  Chief Complaint   Patient presents with   • Headache   • Cough   • Body Aches   • Congestion       History of Presenting Illness  46 y.o. female with past medical history of rheumatoid arthritis and atrial fibrillation who presented 5/30/2021 with congestion, headache and cough for 4 days.  Patient reports she started feeling unwell on Thursday with sinus congestion and associated headache in her forehead.  Patient reports she also developed body aches and a cough.  Patient denies any fevers, chills, chest pain, shortness of breath or los of taste/smell.  She has had one episode of loose stool.  Patient lives in a [facility] house and multiple people she lives with have been having similar symptoms and isolating in the rooms.  Patient reports that she is there she is trying to stay clean of methamphetamines and has been sober for the last 7 months.    In the ER vital signs significant for BP 86/50.  Labs significant for WBC 3.7, sodium 134, Covid positive.     Review of Systems  Review of Systems   Constitutional: Positive for malaise/fatigue. Negative for chills and fever.   HENT: Positive for congestion.    Respiratory: Positive for cough. Negative for shortness of breath.    Cardiovascular: Negative for chest pain.   Gastrointestinal: Negative for abdominal pain, nausea and vomiting.   Genitourinary: Negative for dysuria.   Musculoskeletal: Positive for myalgias.   Skin: Negative for rash.   Neurological: Positive for headaches. Negative for dizziness.   Psychiatric/Behavioral: Negative for depression.   All other systems reviewed and are negative.      Past Medical History   has a past medical history of Degeneration of cervical intervertebral disc, Diverticulitis, Diverticulosis, Diverticulosis, Fibromyalgia,  Interstitial cystitis, Lupus (HCC), Ovarian cyst, and Rheumatoid arthritis(714.0).  Paroxysmal atrial fibrillation    Surgical History   has a past surgical history that includes gyn surgery; other abdominal surgery; pr u/s leiomyomata ablate <200 cc; and oophorectomy (Left).     Family History  Denies family history of cardiovascular disease or cancers.    Social History   reports that she has been smoking cigarettes. She has been smoking about 0.20 packs per day. She has never used smokeless tobacco. She reports that she does not drink alcohol and has history of methamphetamine use, quit 7 months ago    Allergies  Allergies   Allergen Reactions   • Amoxicillin Diarrhea and Nausea   • Gabapentin Unspecified     Pt is not sure what happens, but knows that she has an allergie to this medication.     • Tramadol Rash     Pt thinks that she gets a rash from this medication        Medications  Prior to Admission Medications   Prescriptions Last Dose Informant Patient Reported? Taking?   flecainide (TAMBOCOR) 100 MG Tab Not Taking at Unknown time Patient No No   Sig: Take 1 Tab by mouth 2 times a day.   Patient not taking: Reported on 5/30/2021   hydrocodone-acetaminophen (NORCO) 5-325 MG Tab per tablet Not Taking at Unknown time Patient No No   Sig: Take 1-2 Tabs by mouth every four hours as needed.   Patient not taking: Reported on 5/30/2021   hydrocodone-acetaminophen (NORCO) 5-325 MG Tab per tablet Not Taking at Unknown time Patient No No   Sig: Take 1-2 Tabs by mouth every four hours as needed.   Patient not taking: Reported on 5/30/2021   ibuprofen (MOTRIN) 200 MG Tab 5/30/2021 at 0830 Patient Yes Yes   Sig: Take 400 mg by mouth every 6 hours as needed for Mild Pain.   methylPREDNISolone (MEDROL DOSEPACK) 4 MG tablet Not Taking at Unknown time Patient No No   Sig: Take medication as directed with food   Patient not taking: Reported on 5/30/2021   methylPREDNISolone (MEDROL, MALINI,) 4 MG tablet Not Taking at Unknown  time Patient No No   Si day taper   Patient not taking: Reported on 2021   nicotine polacrilex (NICORETTE) 2 MG Gum Not Taking at Unknown time Patient No No   Sig: Take 1 Each by mouth as needed for Smoking Cessation (For nicotine urge).   Patient not taking: Reported on 2021      Facility-Administered Medications: None       Physical Exam  Temp:  [36.1 °C (97 °F)] 36.1 °C (97 °F)  Pulse:  [75-89] 75  Resp:  [20] 20  BP: ()/(50-66) 86/50  SpO2:  [93 %-96 %] 93 %    Physical Exam  Vitals and nursing note reviewed.   Constitutional:       General: She is not in acute distress.  HENT:      Head: Normocephalic and atraumatic.   Eyes:      Extraocular Movements: Extraocular movements intact.      Conjunctiva/sclera: Conjunctivae normal.      Pupils: Pupils are equal, round, and reactive to light.   Cardiovascular:      Rate and Rhythm: Normal rate and regular rhythm.      Pulses: Normal pulses.      Heart sounds: Normal heart sounds.   Pulmonary:      Effort: Pulmonary effort is normal. No respiratory distress.      Breath sounds: Normal breath sounds.   Abdominal:      General: Abdomen is flat. There is no distension.      Palpations: Abdomen is soft.      Tenderness: There is no abdominal tenderness.   Musculoskeletal:         General: Normal range of motion.      Cervical back: Normal range of motion and neck supple.      Right lower leg: No edema.      Left lower leg: No edema.   Skin:     General: Skin is warm and dry.   Neurological:      General: No focal deficit present.      Mental Status: She is alert and oriented to person, place, and time.      Cranial Nerves: No cranial nerve deficit.         Laboratory:  Recent Labs     21  1459   WBC 3.7*   RBC 4.02*   HEMOGLOBIN 12.5   HEMATOCRIT 38.7   MCV 96.3   MCH 31.1   MCHC 32.3*   RDW 43.0   PLATELETCT 229   MPV 9.7     Recent Labs     21  1459   SODIUM 134*   POTASSIUM 4.3   CHLORIDE 99   CO2 25   GLUCOSE 94   BUN 9   CREATININE  0.54   CALCIUM 8.4     Recent Labs     05/30/21  1459   ALTSGPT 20   ASTSGOT 20   ALKPHOSPHAT 77   TBILIRUBIN 0.2   GLUCOSE 94         No results for input(s): NTPROBNP in the last 72 hours.      No results for input(s): TROPONINT in the last 72 hours.    Imaging:  DX-CHEST-PORTABLE (1 VIEW)   Final Result      Mild cardiomegaly.            Assessment/Plan:  I anticipate this patient is appropriate for observation status at this time.    COVID-19- (present on admission)  Assessment & Plan  Chest x-ray shows no acute process or infiltrates  Isolation precautions  Currently patient on room air, no indication for Decadron  Mild hypotension, will give to 250 mL bolus, avoid excessive fluids in covid   Not septic on admission, 1/4 SIRS leukopenia   Supportive care    Social work consulted, patient currently lives in a prison house, will need to be discharged to [facility]    A-fib (HCC)- (present on admission)  Assessment & Plan  Hx of paroxysmal back in 2015   Currently in sinus   EKG pending   Low CHADSVAC no indication for AC         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

## 2021-05-30 NOTE — ED TRIAGE NOTES
"Presents complaining of nasal congestion (with a history of sinusitis), frontal headache, a productive cough, and generalized body aches recurring for the past 3 days.  She denies fever.   Chief Complaint   Patient presents with   • Headache   • Cough   • Body Aches   • Congestion     /66   Pulse 89   Temp 36.1 °C (97 °F) (Temporal)   Resp 20   Ht 1.626 m (5' 4\")   Wt 71 kg (156 lb 8.4 oz)   SpO2 96%   BMI 26.87 kg/m²      "

## 2021-05-31 PROBLEM — E87.1 HYPONATREMIA: Status: RESOLVED | Noted: 2021-05-30 | Resolved: 2021-05-31

## 2021-05-31 LAB
ANION GAP SERPL CALC-SCNC: 7 MMOL/L (ref 7–16)
BUN SERPL-MCNC: 8 MG/DL (ref 8–22)
CALCIUM SERPL-MCNC: 8.7 MG/DL (ref 8.4–10.2)
CHLORIDE SERPL-SCNC: 106 MMOL/L (ref 96–112)
CO2 SERPL-SCNC: 27 MMOL/L (ref 20–33)
CREAT SERPL-MCNC: 0.56 MG/DL (ref 0.5–1.4)
ERYTHROCYTE [DISTWIDTH] IN BLOOD BY AUTOMATED COUNT: 43.8 FL (ref 35.9–50)
GLUCOSE SERPL-MCNC: 110 MG/DL (ref 65–99)
HCT VFR BLD AUTO: 40.1 % (ref 37–47)
HGB BLD-MCNC: 12.6 G/DL (ref 12–16)
MCH RBC QN AUTO: 30.8 PG (ref 27–33)
MCHC RBC AUTO-ENTMCNC: 31.4 G/DL (ref 33.6–35)
MCV RBC AUTO: 98 FL (ref 81.4–97.8)
PLATELET # BLD AUTO: 219 K/UL (ref 164–446)
PMV BLD AUTO: 10.3 FL (ref 9–12.9)
POTASSIUM SERPL-SCNC: 4.6 MMOL/L (ref 3.6–5.5)
RBC # BLD AUTO: 4.09 M/UL (ref 4.2–5.4)
SODIUM SERPL-SCNC: 140 MMOL/L (ref 135–145)
WBC # BLD AUTO: 4.2 K/UL (ref 4.8–10.8)

## 2021-05-31 PROCEDURE — A9270 NON-COVERED ITEM OR SERVICE: HCPCS | Performed by: INTERNAL MEDICINE

## 2021-05-31 PROCEDURE — 99224 PR SUBSEQUENT OBSERVATION CARE,LEVEL I: CPT | Performed by: HOSPITALIST

## 2021-05-31 PROCEDURE — 80048 BASIC METABOLIC PNL TOTAL CA: CPT

## 2021-05-31 PROCEDURE — G0378 HOSPITAL OBSERVATION PER HR: HCPCS

## 2021-05-31 PROCEDURE — 85027 COMPLETE CBC AUTOMATED: CPT

## 2021-05-31 PROCEDURE — 700102 HCHG RX REV CODE 250 W/ 637 OVERRIDE(OP): Performed by: INTERNAL MEDICINE

## 2021-05-31 PROCEDURE — 96374 THER/PROPH/DIAG INJ IV PUSH: CPT

## 2021-05-31 PROCEDURE — 700111 HCHG RX REV CODE 636 W/ 250 OVERRIDE (IP): Performed by: INTERNAL MEDICINE

## 2021-05-31 RX ORDER — KETOROLAC TROMETHAMINE 30 MG/ML
15 INJECTION, SOLUTION INTRAMUSCULAR; INTRAVENOUS EVERY 6 HOURS PRN
Status: DISCONTINUED | OUTPATIENT
Start: 2021-05-31 | End: 2021-06-01 | Stop reason: HOSPADM

## 2021-05-31 RX ADMIN — ACETAMINOPHEN 650 MG: 325 TABLET, FILM COATED ORAL at 02:08

## 2021-05-31 RX ADMIN — ACETAMINOPHEN 650 MG: 325 TABLET, FILM COATED ORAL at 12:25

## 2021-05-31 RX ADMIN — KETOROLAC TROMETHAMINE 15 MG: 30 INJECTION, SOLUTION INTRAMUSCULAR at 20:57

## 2021-05-31 ASSESSMENT — ENCOUNTER SYMPTOMS
MYALGIAS: 0
BRUISES/BLEEDS EASILY: 0
FOCAL WEAKNESS: 0
NERVOUS/ANXIOUS: 0
COUGH: 0
STRIDOR: 0
EYE REDNESS: 0
FLANK PAIN: 0
SHORTNESS OF BREATH: 0
FEVER: 1
ABDOMINAL PAIN: 0
VOMITING: 0
EYE DISCHARGE: 0
CHILLS: 1

## 2021-05-31 ASSESSMENT — PAIN DESCRIPTION - PAIN TYPE
TYPE: ACUTE PAIN

## 2021-05-31 ASSESSMENT — FIBROSIS 4 INDEX: FIB4 SCORE: 0.98

## 2021-05-31 NOTE — DISCHARGE PLANNING
Anticipated Discharge Disposition: Covid Housing    Action: LSW emailed [facility] to confirm they are still open for referrals.     Received response form Matt with [facility] stating that they can take new referrals and to just email when we had the discharge summary. Asked Hospitalist RN to request discharge summary.    [facility]: [email or web address]  Office: [phone] Cell      Barriers to Discharge: Housing    Plan:CM to email referral to [email or web address].

## 2021-05-31 NOTE — PROGRESS NOTES
Received report from Haylee PINEDA. Pt sitting on edge of bed, stating no needs at this time. Pt RA.

## 2021-05-31 NOTE — ED NOTES
Rounded on pt- updated pt on POC- waiting for room assignment- AIDET done white board updated.  Pt c/o headached asked for tylenol.

## 2021-05-31 NOTE — DISCHARGE SUMMARY
Discharge Summary draft    CHIEF COMPLAINT ON ADMISSION  Chief Complaint   Patient presents with   • Headache   • Cough   • Body Aches   • Congestion       Reason for Admission  Headache, Cough,Body Aches     Admission Date  5/30/2021    CODE STATUS  Full Code    HPI & HOSPITAL COURSE  46F, PMHx RA, Afib not on anticoagulation, Meth user in remission admitted 5/31 with headache generalized weakness fever, found to have COVID-19 infection without hypoxemic respiratory failure.  Patient lives in longterm house with multiple people needs to go to [facility].  Consider steroids if patient develops hypoxemic respiratory failure on duration of symptoms more than 7 days. Therefore, she will be discharged in fair and stable condition to Covid housing.    Discharge Date  6/1/2021     DISCHARGE DIAGNOSES  Active Problems:    A-fib ([facility]) POA: Yes    COVID-19 POA: Yes    Leukopenia POA: Yes    Methamphetamine abuse in remission ([facility]) POA: Yes  Resolved Problems:    Hyponatremia POA: Yes    MEDICATIONS ON DISCHARGE     Medication List      CONTINUE taking these medications      Instructions   flecainide 100 MG Tabs  Commonly known as: TAMBOCOR   Doctor's comments: One tablet soon after you get symptoms of the atrial fibrillation,  And call cardiology within 24 hrs for further advice.  Take 1 Tab by mouth 2 times a day.  Dose: 100 mg     * HYDROcodone-acetaminophen 5-325 MG Tabs per tablet  Commonly known as: Norco   Take 1-2 Tabs by mouth every four hours as needed.  Dose: 1-2 tablet     * HYDROcodone-acetaminophen 5-325 MG Tabs per tablet  Commonly known as: NORCO   Take 1-2 Tabs by mouth every four hours as needed.  Dose: 1-2 tablet     ibuprofen 200 MG Tabs  Commonly known as: MOTRIN   Take 400 mg by mouth every 6 hours as needed for Mild Pain.  Dose: 400 mg     * methylPREDNISolone 4 MG tablet  Commonly known as: MEDROL DOSEPACK   Take medication as directed with food     * methylPREDNISolone 4 MG tablet  Commonly known  as: Medrol (John)   6 day taper     nicotine polacrilex 2 MG Gum  Commonly known as: NICORETTE   Take 1 Each by mouth as needed for Smoking Cessation (For nicotine urge).  Dose: 2 mg         * This list has 4 medication(s) that are the same as other medications prescribed for you. Read the directions carefully, and ask your doctor or other care provider to review them with you.                Allergies  Allergies   Allergen Reactions   • Amoxicillin Diarrhea and Nausea   • Gabapentin Unspecified     Pt is not sure what happens, but knows that she has an allergie to this medication.     • Tramadol Rash     Pt thinks that she gets a rash from this medication        DIET  Orders Placed This Encounter   Procedures   • Diet Order Diet: Regular     Standing Status:   Standing     Number of Occurrences:   1     Order Specific Question:   Diet:     Answer:   Regular [1]     LABORATORY  Lab Results   Component Value Date    SODIUM 140 05/31/2021    POTASSIUM 4.6 05/31/2021    CHLORIDE 106 05/31/2021    CO2 27 05/31/2021    GLUCOSE 110 (H) 05/31/2021    BUN 8 05/31/2021    CREATININE 0.56 05/31/2021        Lab Results   Component Value Date    WBC 4.2 (L) 05/31/2021    HEMOGLOBIN 12.6 05/31/2021    HEMATOCRIT 40.1 05/31/2021    PLATELETCT 219 05/31/2021

## 2021-05-31 NOTE — PROGRESS NOTES
[facility] Medicine Daily Progress Note    Date of Service  5/31/2021    Chief Complaint  46 y.o. female admitted 5/30/2021 with headache generalized weakness and fevers    Hospital Course  46F, PMHx RA, Afib, Meth user in remission admitted 5/31 with headache generalized weakness fever, found to have COVID-19 infection without hypoxemic respiratory failure.  Patient lives in senior living house with multiple people needs to go to [facility]    Interval Problem Update  Saturating well on room air.  Hemodynamically stable overnight no elevated temperatures.  No lower extremity pain redness or swelling.  DVT prophylaxis with Lovenox  Consider steroids if patient develops hypoxemic respiratory failure on duration of symptoms more than 7 days   Discussed with patient, patient's nurse and with multidisciplinary team during rounds including , pharmacist and charge nurse.      Consultants/Specialty  None    Code Status  Full Code    Disposition  Medically cleared for [facility]    Review of Systems  Review of Systems   Constitutional: Positive for chills, fever and malaise/fatigue.   Eyes: Negative for discharge and redness.   Respiratory: Negative for cough, shortness of breath and stridor.    Cardiovascular: Negative for chest pain and leg swelling.   Gastrointestinal: Negative for abdominal pain and vomiting.   Genitourinary: Negative for flank pain.   Musculoskeletal: Negative for myalgias.   Skin: Negative.    Neurological: Negative for focal weakness.   Endo/Heme/Allergies: Does not bruise/bleed easily.   Psychiatric/Behavioral: The patient is not nervous/anxious.       Physical Exam  Temp:  [36.1 °C (97 °F)-37.3 °C (99.2 °F)] 37.1 °C (98.7 °F)  Pulse:  [75-89] 78  Resp:  [18-20] 18  BP: ()/(50-73) 117/67  SpO2:  [93 %-98 %] 95 %    Physical Exam  Constitutional:       General: She is not in acute distress.  HENT:      Head: Normocephalic and atraumatic.      Right Ear: External ear normal.      Left  Ear: External ear normal.      Nose: No congestion or rhinorrhea.      Mouth/Throat:      Mouth: Mucous membranes are dry.      Pharynx: No oropharyngeal exudate or posterior oropharyngeal erythema.   Eyes:      General: No scleral icterus.        Right eye: No discharge.         Left eye: No discharge.      Conjunctiva/sclera: Conjunctivae normal.      Pupils: Pupils are equal, round, and reactive to light.   Cardiovascular:      Heart sounds: No friction rub. No gallop.    Pulmonary:      Effort: Pulmonary effort is normal.   Abdominal:      General: Abdomen is flat. There is no distension.      Tenderness: There is no guarding.   Musculoskeletal:         General: No swelling.      Cervical back: Neck supple. No rigidity. No muscular tenderness.      Right lower leg: No edema.      Left lower leg: No edema.   Skin:     General: Skin is dry.      Capillary Refill: Capillary refill takes 2 to 3 seconds.      Coloration: Skin is not jaundiced or pale.      Findings: No bruising or erythema.   Neurological:      Mental Status: She is alert and oriented to person, place, and time.   Psychiatric:         Mood and Affect: Mood normal.         Judgment: Judgment normal.         Fluids  No intake or output data in the 24 hours ending 05/31/21 0857    Laboratory  Recent Labs     05/30/21  1459 05/31/21  0437   WBC 3.7* 4.2*   RBC 4.02* 4.09*   HEMOGLOBIN 12.5 12.6   HEMATOCRIT 38.7 40.1   MCV 96.3 98.0*   MCH 31.1 30.8   MCHC 32.3* 31.4*   RDW 43.0 43.8   PLATELETCT 229 219   MPV 9.7 10.3     Recent Labs     05/30/21  1459 05/31/21  0437   SODIUM 134* 140   POTASSIUM 4.3 4.6   CHLORIDE 99 106   CO2 25 27   GLUCOSE 94 110*   BUN 9 8   CREATININE 0.54 0.56   CALCIUM 8.4 8.7                   Imaging  DX-CHEST-PORTABLE (1 VIEW)   Final Result      Mild cardiomegaly.           Assessment/Plan  Methamphetamine abuse in remission (HCC)- (present on admission)  Assessment & Plan  Sober for 7 months   Lives in a [facility] house    Social work consulted for dc planning     Leukopenia- (present on admission)  Assessment & Plan  2/2 covid   No sign of concurrent infection   Repeat CBC in am     Hyponatremia  Assessment & Plan  Mild, 134 on admit   Avoid IVF in setting of covid   Repeat BNP in am     COVID-19- (present on admission)  Assessment & Plan  Chest x-ray shows no acute process or infiltrates  Isolation precautions  Currently patient on room air, no indication for Decadron  Mild hypotension, will give to 250 mL bolus, avoid excessive fluids in covid   Not septic on admission, 1/4 SIRS leukopenia   Supportive care    Social work consulted, patient currently lives in a long term house, will need to be discharged to [facility] housing    A-fib (HCC)- (present on admission)  Assessment & Plan  Hx of paroxysmal back in 2015   Currently in sinus   EKG pending   Low CHADSVAC no indication for AC              VTE prophylaxis: Lovenox

## 2021-05-31 NOTE — CARE PLAN
Problem: Pain - Standard  Goal: Alleviation of pain or a reduction in pain to the patient’s comfort goal  Outcome: Progressing     Problem: Knowledge Deficit - Standard  Goal: Patient and family/care givers will demonstrate understanding of plan of care, disease process/condition, diagnostic tests and medications  Outcome: Progressing   The patient is Stable - Low risk of patient condition declining or worsening         Progress made toward(s) clinical / shift goals:        Patient is not progressing towards the following goals:

## 2021-05-31 NOTE — ED NOTES
Pt aware of POC to be admitted  Pt requested that [facility] be called and informed of her current medical issues  Sharri from the Center was informed  pt aware that when she is to be dischged she is to call the [facility] and they will arrange transport back to the Center

## 2021-05-31 NOTE — CARE PLAN
The patient is Stable - Low risk of patient condition declining or worsening         Progress made toward(s) clinical / shift goals:    Problem: Knowledge Deficit - Standard  Goal: Patient and family/care givers will demonstrate understanding of plan of care, disease process/condition, diagnostic tests and medications  Outcome: Discharged - Not Met  Note: Pt educated about Covid isolation and need to monitor vitals during stay.     Problem: Respiratory  Goal: Patient will achieve/maintain optimum respiratory ventilation and gas exchange  Outcome: Met  Flowsheets (Taken 5/31/2021 1497)  O2 Delivery Device: None - Room Air  Note: Pts lungs assessed. Watching pt for SOB. Pt educated to call staff if feeling SOB.        Patient is not progressing towards the following goals:

## 2021-05-31 NOTE — PROGRESS NOTES
Spiritual Care Note    Patient Information     Patient's Name: Ama Ferrera   MRN: [id number]    YOB: 1975   Age and Gender: 46 y.o. female   Service Area: [address]   Room (and Bed): 3307/00   Ethnicity or Nationality:     Primary Language: English   Zoroastrianism/Spiritual preference: Presybeterian   Place of Residence: [place]   Family/Friends/Others Present: No   Clinical Team Present: No   Medical Diagnosis(-es)/Procedure(s): COVID -19   Code Status: Full Code    Date of Admission: 5/30/2021   Length of Stay: 0 days        Spiritual Care Provider Information:  Name of Spiritual Care Provider: Jaylen Palumbo  Title of Spiritual Care Provider: Associate   Phone Number: [phone]  E-mail: [email or web address]  Total time : 5 minutes    Spiritual Screen Results:    Gen Nursing        Palliative Care         Encounter/Request Information    Encounter/Request Type   Visited With: Patient, Patient not available (Pt asleep at the time of attempted visit)    Nature of the Visit: Initial, On shift    General Visit: Yes    Referral From/ Origin of Request: Verbal family    Religous Needs/Values       Spiritual Assessment     Spiritual Care Encounters  Plan: Visit Upon Request    Notes:

## 2021-06-01 VITALS
BODY MASS INDEX: 24.35 KG/M2 | DIASTOLIC BLOOD PRESSURE: 65 MMHG | TEMPERATURE: 98.5 F | RESPIRATION RATE: 20 BRPM | OXYGEN SATURATION: 98 % | HEIGHT: 64 IN | WEIGHT: 142.64 LBS | HEART RATE: 80 BPM | SYSTOLIC BLOOD PRESSURE: 112 MMHG

## 2021-06-01 PROCEDURE — 96376 TX/PRO/DX INJ SAME DRUG ADON: CPT

## 2021-06-01 PROCEDURE — A9270 NON-COVERED ITEM OR SERVICE: HCPCS | Performed by: INTERNAL MEDICINE

## 2021-06-01 PROCEDURE — 700102 HCHG RX REV CODE 250 W/ 637 OVERRIDE(OP): Performed by: INTERNAL MEDICINE

## 2021-06-01 PROCEDURE — G0378 HOSPITAL OBSERVATION PER HR: HCPCS

## 2021-06-01 PROCEDURE — 99217 PR OBSERVATION CARE DISCHARGE: CPT | Performed by: STUDENT IN AN ORGANIZED HEALTH CARE EDUCATION/TRAINING PROGRAM

## 2021-06-01 PROCEDURE — 700111 HCHG RX REV CODE 636 W/ 250 OVERRIDE (IP): Performed by: INTERNAL MEDICINE

## 2021-06-01 RX ORDER — PSEUDOEPHEDRINE HCL 30 MG
30 TABLET ORAL EVERY 6 HOURS PRN
Status: DISCONTINUED | OUTPATIENT
Start: 2021-06-01 | End: 2021-06-01 | Stop reason: HOSPADM

## 2021-06-01 RX ADMIN — PSEUDOEPHEDRINE HCL 30 MG: 30 TABLET, FILM COATED ORAL at 04:13

## 2021-06-01 RX ADMIN — KETOROLAC TROMETHAMINE 15 MG: 30 INJECTION, SOLUTION INTRAMUSCULAR at 02:47

## 2021-06-01 RX ADMIN — ACETAMINOPHEN 650 MG: 325 TABLET, FILM COATED ORAL at 08:58

## 2021-06-01 ASSESSMENT — ENCOUNTER SYMPTOMS
ABDOMINAL PAIN: 0
SHORTNESS OF BREATH: 0
EYE DISCHARGE: 0
COUGH: 0
NERVOUS/ANXIOUS: 0
FLANK PAIN: 0
BRUISES/BLEEDS EASILY: 0
STRIDOR: 0
CHILLS: 1
FEVER: 1
EYE REDNESS: 0
MYALGIAS: 0
FOCAL WEAKNESS: 0
VOMITING: 0

## 2021-06-01 ASSESSMENT — PAIN DESCRIPTION - PAIN TYPE
TYPE: ACUTE PAIN
TYPE: ACUTE PAIN

## 2021-06-01 NOTE — PROGRESS NOTES
[facility] Medicine Daily Progress Note    Date of Service  6/1/2021    Chief Complaint  46 y.o. female admitted 5/30/2021 with headache generalized weakness and fevers    Hospital Course  46F, PMHx RA, Afib, Meth user in remission admitted 5/31 with headache generalized weakness fever, found to have COVID-19 infection without hypoxemic respiratory failure.  Patient lives in long-term house with multiple people needs to go to [facility]    Interval Problem Update  Patient seen and examined at bedside this morning.  No acute events overnight.     Plans for d/c to covid housing today.     Consultants/Specialty  None    Code Status  Full Code    Disposition  Medically cleared for [facility]    Review of Systems  Review of Systems   Constitutional: Positive for chills, fever and malaise/fatigue.   Eyes: Negative for discharge and redness.   Respiratory: Negative for cough, shortness of breath and stridor.    Cardiovascular: Negative for chest pain and leg swelling.   Gastrointestinal: Negative for abdominal pain and vomiting.   Genitourinary: Negative for flank pain.   Musculoskeletal: Negative for myalgias.   Skin: Negative.    Neurological: Negative for focal weakness.   Endo/Heme/Allergies: Does not bruise/bleed easily.   Psychiatric/Behavioral: The patient is not nervous/anxious.       Physical Exam  Temp:  [36.9 °C (98.5 °F)-37.2 °C (99 °F)] 36.9 °C (98.5 °F)  Pulse:  [75-88] 80  Resp:  [18-20] 20  BP: (104-115)/(53-71) 112/65  SpO2:  [96 %-100 %] 98 %    Physical Exam  Constitutional:       General: She is not in acute distress.  HENT:      Head: Normocephalic and atraumatic.      Right Ear: External ear normal.      Left Ear: External ear normal.      Nose: No congestion or rhinorrhea.      Mouth/Throat:      Mouth: Mucous membranes are dry.      Pharynx: No oropharyngeal exudate or posterior oropharyngeal erythema.   Eyes:      General: No scleral icterus.        Right eye: No discharge.         Left eye: No  discharge.      Conjunctiva/sclera: Conjunctivae normal.      Pupils: Pupils are equal, round, and reactive to light.   Cardiovascular:      Heart sounds: No friction rub. No gallop.    Pulmonary:      Effort: Pulmonary effort is normal.   Abdominal:      General: Abdomen is flat. There is no distension.      Tenderness: There is no guarding.   Musculoskeletal:         General: No swelling.      Cervical back: Neck supple. No rigidity. No muscular tenderness.      Right lower leg: No edema.      Left lower leg: No edema.   Skin:     General: Skin is dry.      Capillary Refill: Capillary refill takes 2 to 3 seconds.      Coloration: Skin is not jaundiced or pale.      Findings: No bruising or erythema.   Neurological:      Mental Status: She is alert and oriented to person, place, and time.   Psychiatric:         Mood and Affect: Mood normal.         Judgment: Judgment normal.         Fluids    Intake/Output Summary (Last 24 hours) at 6/1/2021 1136  Last data filed at 5/31/2021 1200  Gross per 24 hour   Intake 1000 ml   Output --   Net 1000 ml       Laboratory  Recent Labs     05/30/21  1459 05/31/21  0437   WBC 3.7* 4.2*   RBC 4.02* 4.09*   HEMOGLOBIN 12.5 12.6   HEMATOCRIT 38.7 40.1   MCV 96.3 98.0*   MCH 31.1 30.8   MCHC 32.3* 31.4*   RDW 43.0 43.8   PLATELETCT 229 219   MPV 9.7 10.3     Recent Labs     05/30/21  1459 05/31/21  0437   SODIUM 134* 140   POTASSIUM 4.3 4.6   CHLORIDE 99 106   CO2 25 27   GLUCOSE 94 110*   BUN 9 8   CREATININE 0.54 0.56   CALCIUM 8.4 8.7                   Imaging  DX-CHEST-PORTABLE (1 VIEW)   Final Result      Mild cardiomegaly.           Assessment/Plan  Methamphetamine abuse in remission (HCC)- (present on admission)  Assessment & Plan  Sober for 7 months   Lives in a snf house   Social work consulted for dc planning     Leukopenia- (present on admission)  Assessment & Plan  2/2 covid   No sign of concurrent infection   Repeat CBC in am     COVID-19- (present on  admission)  Assessment & Plan  Chest x-ray shows no acute process or infiltrates  Isolation precautions  Currently patient on room air, no indication for Decadron  Consider steroids if patient develops hypoxemic respiratory failure on duration of symptoms more than 7 days    A-fib (HCC)- (present on admission)  Assessment & Plan  Hx of paroxysmal back in 2015   Currently in sinus   EKG pending   Low CHADSVAC no indication for AC            VTE prophylaxis: Lovenox

## 2021-06-01 NOTE — CARE PLAN
"The patient is Watcher - Medium risk of patient condition declining or worsening    Shift Goals  Clinical Goals: Pain management  Patient Goals: Rest, pain management    Progress made toward(s) clinical / shift goals:  Pt had several complaints overnight of severe back and head pain. Pt was provided PRN pain medication as ordered for the back and head pain. Pt stated that back pain was temporarily relieved by PRN administration, however pt felt that their headache was not relieved. Pt stated that she felt \"congested.\" PRN decongestant was administered.    Patient is not progressing towards the following goals:      Problem: Pain - Standard  Goal: Alleviation of pain or a reduction in pain to the patient’s comfort goal  6/1/2021 0544 by Mandi Wagoner, REzraN.  Outcome: Not Progressing       "

## 2021-06-01 NOTE — CARE PLAN
Problem: Pain - Standard  Goal: Alleviation of pain or a reduction in pain to the patient’s comfort goal  Outcome: Discharged - Not Met  Note: Pt stating pain, PRN pain medication given. Pt ambulating in room, pt stating it helps decrease pain.     The patient is Watcher - Medium risk of patient condition declining or worsening    Shift Goals  Clinical Goals: Pain management  Patient Goals: Rest, pain management    Progress made toward(s) clinical / shift goals:  PRN medication given, heat packs    Patient is not progressing towards the following goals:

## 2021-06-01 NOTE — DISCHARGE INSTRUCTIONS
Discharge Instructions    Discharged to other by medical transportation with escort. Discharged via wheelchair, hospital escort: Yes.  Special equipment needed: Not Applicable    Be sure to schedule a follow-up appointment with your primary care doctor or any specialists as instructed.     Discharge Plan:   Diet Plan: Discussed  Activity Level: Discussed  Confirmed Follow up Appointment: Patient to Call and Schedule Appointment  Confirmed Symptoms Management: Discussed  Medication Reconciliation Updated: Yes    I understand that a diet low in cholesterol, fat, and sodium is recommended for good health. Unless I have been given specific instructions below for another diet, I accept this instruction as my diet prescription.   Other diet: Reg    Special Instructions: None    · Is patient discharged on Warfarin / Coumadin?   No     Depression / Suicide Risk    As you are discharged from this [facility] facility, it is important to learn how to keep safe from harming yourself.    Recognize the warning signs:  · Abrupt changes in personality, positive or negative- including increase in energy   · Giving away possessions  · Change in eating patterns- significant weight changes-  positive or negative  · Change in sleeping patterns- unable to sleep or sleeping all the time   · Unwillingness or inability to communicate  · Depression  · Unusual sadness, discouragement and loneliness  · Talk of wanting to die  · Neglect of personal appearance   · Rebelliousness- reckless behavior  · Withdrawal from people/activities they love  · Confusion- inability to concentrate     If you or a loved one observes any of these behaviors or has concerns about self-harm, here's what you can do:  · Talk about it- your feelings and reasons for harming yourself  · Remove any means that you might use to hurt yourself (examples: pills, rope, extension cords, firearm)  · Get professional help from the community (Mental Health, Substance Abuse,  psychological counseling)  · Do not be alone:Call your Safe Contact- someone whom you trust who will be there for you.  · Call your local CRISIS HOTLINE 557-6481 or 449-192-9874  · Call your local Children's Mobile Crisis Response Team [place] (442) 900-4299 or www.[employer]  · Call the toll free National Suicide Prevention Hotlines   · National Suicide Prevention Lifeline 303-456-LLDH (2439)  · [place] [facility] Line Network 800-SUICIDE (066-0228)      COVID-19  COVID-19 is a respiratory infection that is caused by a virus called severe acute respiratory syndrome coronavirus 2 (SARS-CoV-2). The disease is also known as coronavirus disease or novel coronavirus. In some people, the virus may not cause any symptoms. In others, it may cause a serious infection. The infection can get worse quickly and can lead to complications, such as:  · Pneumonia, or infection of the lungs.  · Acute respiratory distress syndrome or ARDS. This is fluid build-up in the lungs.  · Acute respiratory failure. This is a condition in which there is not enough oxygen passing from the lungs to the body.  · Sepsis or septic shock. This is a serious bodily reaction to an infection.  · Blood clotting problems.  · Secondary infections due to bacteria or fungus.  The virus that causes COVID-19 is contagious. This means that it can spread from person to person through droplets from coughs and sneezes (respiratory secretions).  What are the causes?  This illness is caused by a virus. You may catch the virus by:  · Breathing in droplets from an infected person's cough or sneeze.  · Touching something, like a table or a doorknob, that was exposed to the virus (contaminated) and then touching your mouth, nose, or eyes.  What increases the risk?  Risk for infection  You are more likely to be infected with this virus if you:  · Live in or travel to an area with a COVID-19 outbreak.  · Come in contact with a sick person who recently traveled to an  area with a COVID-19 outbreak.  · Provide care for or live with a person who is infected with COVID-19.  Risk for serious illness  You are more likely to become seriously ill from the virus if you:  · Are 65 years of age or older.  · Have a long-term disease that lowers your body's ability to fight infection (immunocompromised).  · Live in a nursing home or long-term care facility.  · Have a long-term (chronic) disease such as:  ? Chronic lung disease, including chronic obstructive pulmonary disease or asthma  ? Heart disease.  ? Diabetes.  ? Chronic kidney disease.  ? Liver disease.  · Are obese.  What are the signs or symptoms?  Symptoms of this condition can range from mild to severe. Symptoms may appear any time from 2 to 14 days after being exposed to the virus. They include:  · A fever.  · A cough.  · Difficulty breathing.  · Chills.  · Muscle pains.  · A sore throat.  · Loss of taste or smell.  Some people may also have stomach problems, such as nausea, vomiting, or diarrhea.  Other people may not have any symptoms of COVID-19.  How is this diagnosed?  This condition may be diagnosed based on:  · Your signs and symptoms, especially if:  ? You live in an area with a COVID-19 outbreak.  ? You recently traveled to or from an area where the virus is common.  ? You provide care for or live with a person who was diagnosed with COVID-19.  · A physical exam.  · Lab tests, which may include:  ? A nasal swab to take a sample of fluid from your nose.  ? A throat swab to take a sample of fluid from your throat.  ? A sample of mucus from your lungs (sputum).  ? Blood tests.  · Imaging tests, which may include, X-rays, CT scan, or ultrasound.  How is this treated?  At present, there is no medicine to treat COVID-19. Medicines that treat other diseases are being used on a trial basis to see if they are effective against COVID-19.  Your health care provider will talk with you about ways to treat your symptoms. For most  people, the infection is mild and can be managed at home with rest, fluids, and over-the-counter medicines.  Treatment for a serious infection usually takes places in a hospital intensive care unit (ICU). It may include one or more of the following treatments. These treatments are given until your symptoms improve.  · Receiving fluids and medicines through an IV.  · Supplemental oxygen. Extra oxygen is given through a tube in the nose, a face mask, or a tejeda.  · Positioning you to lie on your stomach (prone position). This makes it easier for oxygen to get into the lungs.  · Continuous positive airway pressure (CPAP) or bi-level positive airway pressure (BPAP) machine. This treatment uses mild air pressure to keep the airways open. A tube that is connected to a motor delivers oxygen to the body.  · Ventilator. This treatment moves air into and out of the lungs by using a tube that is placed in your windpipe.  · Tracheostomy. This is a procedure to create a hole in the neck so that a breathing tube can be inserted.  · Extracorporeal membrane oxygenation (ECMO). This procedure gives the lungs a chance to recover by taking over the functions of the heart and lungs. It supplies oxygen to the body and removes carbon dioxide.  Follow these instructions at home:  Lifestyle  · If you are sick, stay home except to get medical care. Your health care provider will tell you how long to stay home. Call your health care provider before you go for medical care.  · Rest at home as told by your health care provider.  · Do not use any products that contain nicotine or tobacco, such as cigarettes, e-cigarettes, and chewing tobacco. If you need help quitting, ask your health care provider.  · Return to your normal activities as told by your health care provider. Ask your health care provider what activities are safe for you.  General instructions  · Take over-the-counter and prescription medicines only as told by your health care  provider.  · Drink enough fluid to keep your urine pale yellow.  · Keep all follow-up visits as told by your health care provider. This is important.  How is this prevented?    There is no vaccine to help prevent COVID-19 infection. However, there are steps you can take to protect yourself and others from this virus.  To protect yourself:   · Do not travel to areas where COVID-19 is a risk. The areas where COVID-19 is reported change often. To identify high-risk areas and travel restrictions, check the CDC travel website: wwwnc.cdc.gov/travel/notices  · If you live in, or must travel to, an area where COVID-19 is a risk, take precautions to avoid infection.  ? Stay away from people who are sick.  ? Wash your hands often with soap and water for 20 seconds. If soap and water are not available, use an alcohol-based hand .  ? Avoid touching your mouth, face, eyes, or nose.  ? Avoid going out in public, follow guidance from your state and local health authorities.  ? If you must go out in public, wear a cloth face covering or face mask.  ? Disinfect objects and surfaces that are frequently touched every day. This may include:  § Counters and tables.  § Doorknobs and light switches.  § Sinks and faucets.  § Electronics, such as phones, remote controls, keyboards, computers, and tablets.  To protect others:  If you have symptoms of COVID-19, take steps to prevent the virus from spreading to others.  · If you think you have a COVID-19 infection, contact your health care provider right away. Tell your health care team that you think you may have a COVID-19 infection.  · Stay home. Leave your house only to seek medical care. Do not use public transport.  · Do not travel while you are sick.  · Wash your hands often with soap and water for 20 seconds. If soap and water are not available, use alcohol-based hand .  · Stay away from other members of your household. Let healthy household members care for children  and pets, if possible. If you have to care for children or pets, wash your hands often and wear a mask. If possible, stay in your own room, separate from others. Use a different bathroom.  · Make sure that all people in your household wash their hands well and often.  · Cough or sneeze into a tissue or your sleeve or elbow. Do not cough or sneeze into your hand or into the air.  · Wear a cloth face covering or face mask.  Where to find more information  · Centers for Disease Control and Prevention: www.cdc.gov/coronavirus/2019-ncov/index.html  · World Health Organization: www.who.int/health-topics/coronavirus  Contact a health care provider if:  · You live in or have traveled to an area where COVID-19 is a risk and you have symptoms of the infection.  · You have had contact with someone who has COVID-19 and you have symptoms of the infection.  Get help right away if:  · You have trouble breathing.  · You have pain or pressure in your chest.  · You have confusion.  · You have bluish lips and fingernails.  · You have difficulty waking from sleep.  · You have symptoms that get worse.  These symptoms may represent a serious problem that is an emergency. Do not wait to see if the symptoms will go away. Get medical help right away. Call your local emergency services (911 in the [place] Do not drive yourself to the hospital. Let the emergency medical personnel know if you think you have COVID-19.  Summary  · COVID-19 is a respiratory infection that is caused by a virus. It is also known as coronavirus disease or novel coronavirus. It can cause serious infections, such as pneumonia, acute respiratory distress syndrome, acute respiratory failure, or sepsis.  · The virus that causes COVID-19 is contagious. This means that it can spread from person to person through droplets from coughs and sneezes.  · You are more likely to develop a serious illness if you are 65 years of age or older, have a weak immunity, live in a nursing  home, or have chronic disease.  · There is no medicine to treat COVID-19. Your health care provider will talk with you about ways to treat your symptoms.  · Take steps to protect yourself and others from infection. Wash your hands often and disinfect objects and surfaces that are frequently touched every day. Stay away from people who are sick and wear a mask if you are sick.  This information is not intended to replace advice given to you by your health care provider. Make sure you discuss any questions you have with your health care provider.  Document Released: 01/23/2020 Document Revised: 05/14/2020 Document Reviewed: 01/23/2020  Elsevier Patient Education © 2020 [employer]

## 2021-06-01 NOTE — DISCHARGE PLANNING
Anticipated Discharge Disposition: COVID Housing    Action: Spoke with patient via the phone.  Discussed the option of COVID housing.  She confirmed that she is not able to isolate at her transitional housing location and agreed that COVID housing is the best option for her at this point.  This RN CM completed application and e-[facility]ed [facility] and [facility] [email or web address]    Barriers to Discharge: Acceptance into COVID housing    Plan: RN CM to follow up with Matt lozano/ LUIS E sanchez for acceptance and transportation.

## 2021-06-01 NOTE — PROGRESS NOTES
Pt c/o of severe headache due to congestion. Pt states that it is unresolved by PRN Toradol administration. MD Skaggs notified. New PRN orders placed.

## 2021-06-01 NOTE — PROGRESS NOTES
Pt complaining of 8/10 head and back pain. MD Waterman notified of pt's pain level. New PRN orders placed.

## 2021-06-01 NOTE — DISCHARGE PLANNING
Anticipated Discharge Disposition: COVID Housing    Action: Received confirmation from Radha lozano/ LUIS E sanchez that patient is accepted and will be going to [facility].  She arranged [facility] transport for 10:00, however, GEOVANI RN, Kelsi requested transport be moved back to 11:00 as she was in with another patient.  MIRIAM FULLER spoke with Radha, who stated they had another admission at 11:00, so she scheduled [facility]  for 11:30.  GEOVANI PINEDA and Dr. Larsen aware.      Barriers to Discharge: None    Plan: RN CM available for any further dc planning needs.

## 2021-06-01 NOTE — PROGRESS NOTES
Report received from MIRIAM Dolan. Plan of care discussed at patient's bedside. Whiteboard has been updated. Pt is resting comfortably in bed and declines any further needs at this time. COVID precautions in place and safety precautions in place.

## 2021-06-01 NOTE — PROGRESS NOTES
Received discharge orders. Pt oked to give out information to mother if calls about discharge information. Pt given phone and bag of belongings. [facility] came to  pt, discussed location address with [facility] and pt. Pts IV removed. Pt feels safe to discharge.